# Patient Record
Sex: FEMALE | Race: WHITE | NOT HISPANIC OR LATINO | URBAN - METROPOLITAN AREA
[De-identification: names, ages, dates, MRNs, and addresses within clinical notes are randomized per-mention and may not be internally consistent; named-entity substitution may affect disease eponyms.]

---

## 2024-01-18 ENCOUNTER — TELEPHONE (OUTPATIENT)
Dept: GASTROENTEROLOGY | Facility: CLINIC | Age: 49
End: 2024-01-18
Payer: COMMERCIAL

## 2024-01-18 DIAGNOSIS — K21.9 GERD (GASTROESOPHAGEAL REFLUX DISEASE): Primary | ICD-10-CM

## 2024-01-19 ENCOUNTER — TELEPHONE (OUTPATIENT)
Dept: GASTROENTEROLOGY | Facility: CLINIC | Age: 49
End: 2024-01-19
Payer: COMMERCIAL

## 2024-01-26 RX ORDER — METFORMIN HYDROCHLORIDE 500 MG/1
500 TABLET ORAL
COMMUNITY
Start: 2023-09-08 | End: 2024-01-29

## 2024-01-26 RX ORDER — LEVOTHYROXINE, LIOTHYRONINE 38; 9 UG/1; UG/1
60 TABLET ORAL
COMMUNITY
Start: 2023-12-19

## 2024-01-26 RX ORDER — SPIRONOLACTONE 100 MG/1
100 TABLET, FILM COATED ORAL DAILY
COMMUNITY
Start: 2023-12-14

## 2024-01-29 ENCOUNTER — OFFICE VISIT (OUTPATIENT)
Dept: GASTROENTEROLOGY | Facility: CLINIC | Age: 49
End: 2024-01-29
Payer: COMMERCIAL

## 2024-01-29 VITALS
HEART RATE: 78 BPM | OXYGEN SATURATION: 98 % | WEIGHT: 226 LBS | BODY MASS INDEX: 35.47 KG/M2 | SYSTOLIC BLOOD PRESSURE: 126 MMHG | DIASTOLIC BLOOD PRESSURE: 77 MMHG | HEIGHT: 67 IN

## 2024-01-29 DIAGNOSIS — R79.89 ELEVATED LFTS: ICD-10-CM

## 2024-01-29 DIAGNOSIS — R14.0 BLOATING: ICD-10-CM

## 2024-01-29 DIAGNOSIS — K21.9 GASTROESOPHAGEAL REFLUX DISEASE WITHOUT ESOPHAGITIS: Primary | ICD-10-CM

## 2024-01-29 DIAGNOSIS — Z12.11 SCREENING FOR COLON CANCER: ICD-10-CM

## 2024-01-29 PROCEDURE — 1159F MED LIST DOCD IN RCRD: CPT | Mod: CPTII,S$GLB,,

## 2024-01-29 PROCEDURE — 99205 OFFICE O/P NEW HI 60 MIN: CPT | Mod: S$GLB,,,

## 2024-01-29 PROCEDURE — 1160F RVW MEDS BY RX/DR IN RCRD: CPT | Mod: CPTII,S$GLB,,

## 2024-01-29 PROCEDURE — 3074F SYST BP LT 130 MM HG: CPT | Mod: CPTII,S$GLB,,

## 2024-01-29 PROCEDURE — 3078F DIAST BP <80 MM HG: CPT | Mod: CPTII,S$GLB,,

## 2024-01-29 PROCEDURE — 3008F BODY MASS INDEX DOCD: CPT | Mod: CPTII,S$GLB,,

## 2024-01-29 RX ORDER — CYANOCOBALAMIN 1000 UG/ML
2000 INJECTION, SOLUTION INTRAMUSCULAR; SUBCUTANEOUS
COMMUNITY

## 2024-01-29 RX ORDER — OMEPRAZOLE 20 MG/1
20 CAPSULE, DELAYED RELEASE ORAL DAILY
COMMUNITY
End: 2024-01-29

## 2024-01-29 RX ORDER — SOD SULF/POT CHLORIDE/MAG SULF 1.479 G
TABLET ORAL
Qty: 24 TABLET | Refills: 0 | Status: SHIPPED | OUTPATIENT
Start: 2024-01-29

## 2024-01-29 RX ORDER — PROGESTERONE 100 MG/1
100 CAPSULE ORAL DAILY
COMMUNITY

## 2024-01-29 RX ORDER — FAMOTIDINE 20 MG/1
20 TABLET, FILM COATED ORAL DAILY
COMMUNITY
End: 2024-01-29

## 2024-01-29 RX ORDER — PANTOPRAZOLE SODIUM 40 MG/1
40 TABLET, DELAYED RELEASE ORAL DAILY
Qty: 90 TABLET | Refills: 1 | Status: SHIPPED | OUTPATIENT
Start: 2024-01-29

## 2024-01-29 NOTE — PROGRESS NOTES
Clinic Note    Reason for visit:  The primary encounter diagnosis was Gastroesophageal reflux disease without esophagitis. Diagnoses of Bloating, Elevated LFTs, and Screening for colon cancer were also pertinent to this visit.    PCP: Lashonda Bearden   No address on file Ashley Holm NP at [facility] in [place], [place].     HPI:  This is a 48 y.o. female who is here to establish care. She has been working with  for about a year now to compete. She went keto diet for 5 years in the past. Got off birth control and was put on HTN (test/progesterone). She gained 20 pounds in past year. She started getting heartburn daily a year ago as well. She started taking famotidine 20 mg daily and this helped somewhat. She also reports having bloating/distention after eating. She went low carb/calorie again in 12/2023 and has lost 20 pounds and noticed less bloating. She usually fasts until lunch, may have protein shake or meat/vegetable. She started taking omeprazole 20 mg daily around 12/2023 and has had more improvement with reflux so she stopped famotidine.  No bloating if she doesn't eat. Denies dysphagia. No nausea or blood in stool. No alcohol use. May eat 1,000 calories per day. She has BM daily or QOD. Takes magnesium. No diarrhea. No prior EGD/Colonoscopy. No FH of colon cancer, IBD, or Celiac. Father had stomach cancer at 46. Had TMJ surgery in high school.     She has O positive blood and gives blood every 4 months.     She has high stress job.  at [employer].    12/2023: HCVAb/HBVsAg/HBVcAb neg, HAVAb pos, Ferr/Fe/%sat//Trnsfrr/TIBC/Lipid wnl  11/2023: AST 41H, ALT 54H, Cr 0.9, CMP onl, CBC wnl    12/2023 ABD US: Fatty liver, no GB.     Review of Systems   Constitutional:  Negative for fatigue, fever and unexpected weight change.   HENT:  Negative for mouth sores, postnasal drip, sore throat and trouble swallowing.    Eyes:  Negative for pain, discharge and eye dryness.    Respiratory:  Positive for cough. Negative for apnea, choking, chest tightness, shortness of breath and wheezing.    Cardiovascular:  Negative for chest pain, palpitations and leg swelling.   Gastrointestinal:  Positive for abdominal distention and abdominal pain. Negative for anal bleeding, blood in stool, change in bowel habit, constipation, diarrhea, nausea, rectal pain, vomiting, reflux and fecal incontinence.   Genitourinary:  Negative for bladder incontinence, dysuria and hematuria.   Musculoskeletal:  Positive for back pain. Negative for arthralgias and joint swelling.   Integumentary:  Negative for color change and rash.   Allergic/Immunologic: Negative for environmental allergies and food allergies.   Neurological:  Negative for seizures and headaches.   Hematological:  Negative for adenopathy. Does not bruise/bleed easily.        Past Medical History:   Diagnosis Date    Disorder of thyroid, unspecified 2023    Hormone replacement therapy     Obesity, Class II, BMI 35-39.9, isolated (see actual BMI)      Past Surgical History:   Procedure Laterality Date    ABDOMINOPLASTY  2021    MANDIBLE FRACTURE SURGERY  1990    REDUCTION OF BOTH BREASTS  1998    TONSILLECTOMY  1979     Family History   Problem Relation Age of Onset    Thyroid disease Mother     Stomach cancer Father     Diabetes Paternal Grandmother     Colon cancer Neg Hx     Crohn's disease Neg Hx     Esophageal cancer Neg Hx     Irritable bowel syndrome Neg Hx     Liver cancer Neg Hx     Liver disease Neg Hx     Rectal cancer Neg Hx      Social History     Tobacco Use    Smoking status: Never    Smokeless tobacco: Never   Substance Use Topics    Alcohol use: Not Currently    Drug use: Never     Review of patient's allergies indicates:  No Known Allergies   Medication List with Changes/Refills   New Medications    PANTOPRAZOLE (PROTONIX) 40 MG TABLET    Take 1 tablet (40 mg total) by mouth once daily.    SOD SULF-POT CHLORIDE-MAG SULF (SUTAB)  "1.479-0.188- 0.225 GRAM TABLET    Take according to package instructions with indicated amount of water. No breakfast day before test. May substitute with Suprep, Clenpiq, Plenvu, Moviprep or GoLytely based on Rx plan and patient preference.   Current Medications    CYANOCOBALAMIN 1,000 MCG/ML INJECTION    2,000 mcg every 7 days.    NP THYROID 60 MG TAB    Take 60 mg by mouth before breakfast.    PROGESTERONE (PROMETRIUM) 100 MG CAPSULE    Take 100 mg by mouth once daily.    SPIRONOLACTONE (ALDACTONE) 100 MG TABLET    Take 100 mg by mouth once daily.   Discontinued Medications    FAMOTIDINE (PEPCID) 20 MG TABLET    Take 20 mg by mouth once daily.    METFORMIN (GLUCOPHAGE) 500 MG TABLET    Take 500 mg by mouth daily with breakfast. For 30 days    OMEPRAZOLE (PRILOSEC) 20 MG CAPSULE    Take 20 mg by mouth once daily.         Vital Signs:  /77 (BP Location: Left arm, Patient Position: Sitting)   Pulse 78   Ht 5' 7" (1.702 m)   Wt 102.5 kg (226 lb)   SpO2 98%   BMI 35.40 kg/m²        Physical Exam  Vitals reviewed.   Constitutional:       General: She is awake. She is not in acute distress.     Appearance: Normal appearance. She is well-developed. She is not ill-appearing, toxic-appearing or diaphoretic.   HENT:      Head: Normocephalic and atraumatic.      Nose: Nose normal.      Mouth/Throat:      Mouth: Mucous membranes are moist.      Pharynx: Oropharynx is clear. No oropharyngeal exudate or posterior oropharyngeal erythema.   Eyes:      General: Lids are normal. Gaze aligned appropriately. No scleral icterus.        Right eye: No discharge.         Left eye: No discharge.      Conjunctiva/sclera: Conjunctivae normal.   Neck:      Trachea: Trachea normal.   Cardiovascular:      Rate and Rhythm: Normal rate and regular rhythm.      Pulses:           Radial pulses are 2+ on the right side and 2+ on the left side.   Pulmonary:      Effort: Pulmonary effort is normal. No respiratory distress.      Breath " sounds: No stridor. No wheezing.   Chest:      Chest wall: No tenderness.   Abdominal:      General: Bowel sounds are normal. There is no distension.      Palpations: Abdomen is soft. There is no fluid wave, hepatomegaly or mass.      Tenderness: There is no abdominal tenderness. There is no guarding or rebound.   Musculoskeletal:         General: No tenderness or deformity.      Cervical back: Full passive range of motion without pain and neck supple. No tenderness.      Right lower leg: No edema.      Left lower leg: No edema.   Lymphadenopathy:      Cervical: No cervical adenopathy.   Skin:     General: Skin is warm and dry.      Capillary Refill: Capillary refill takes less than 2 seconds.      Coloration: Skin is not cyanotic, jaundiced or pale.   Neurological:      General: No focal deficit present.      Mental Status: She is alert and oriented to person, place, and time.      Motor: No tremor.   Psychiatric:         Attention and Perception: Attention normal.         Mood and Affect: Mood and affect normal.         Speech: Speech normal.         Behavior: Behavior normal. Behavior is cooperative.            All of the data above and below has been reviewed by myself and any further interpretations will be reflected in the assessment and plan.   The data includes review of external notes, and independent interpretation of lab results, procedures, x-rays, and imaging reports.      Assessment:  Gastroesophageal reflux disease without esophagitis  -     Ambulatory referral/consult to Gastroenterology  -     pantoprazole (PROTONIX) 40 MG tablet; Take 1 tablet (40 mg total) by mouth once daily.  Dispense: 90 tablet; Refill: 1  -     Ambulatory Referral to External Surgery    Bloating  -     Ambulatory Referral to External Surgery    Elevated LFTs    Screening for colon cancer  -     Ambulatory Referral to External Surgery  -     sod sulf-pot chloride-mag sulf (SUTAB) 1.479-0.188- 0.225 gram tablet; Take according  to package instructions with indicated amount of water. No breakfast day before test. May substitute with Suprep, Clenpiq, Plenvu, Moviprep or GoLytely based on Rx plan and patient preference.  Dispense: 24 tablet; Refill: 0    Bloating likely related to food intolerance. Improved since being mostly gluten free. Plan for GBx/DBx.   Fatty liver on US and mildly elevated LFTs. Would expect to improve with continued weight loss.  Omeprazole 20 mg better than famotidine 20mg. Will try panto 40 daily for reflux.  Due for screening colonoscopy.   Discussed elimination diet etc. Consider low FODMAP diet.      Recommendations:  Schedule upper and lower endoscopies with Dr. Feliz.   Stop omeprazole and begin taking pantoprazole 40 mg daily.   Continue current diet. Try elimination diet.    Risks, benefits, and alternatives of medical management, any associated procedures, and/or treatment discussed with the patient. Patient given opportunity to ask questions and voices understanding. Patient has elected to proceed with the recommended care modalities as discussed.    Follow up visit with Dr. Feliz.     Order summary:  Orders Placed This Encounter   Procedures    Ambulatory Referral to External Surgery        Instructed patient to notify my office if they have not been contacted within two weeks after any procedures, submitting any samples (biopsies, blood, stool, urine, etc.) or after any imaging (X-ray, CT, MRI, etc.).      Ashley Mcintosh NP    This document may have been created using a voice recognition transcribing system. Incorrect words or phrases may have been missed during proofreading. Please interpret accordingly or contact me for clarification.

## 2024-01-29 NOTE — LETTER
January 29, 2024        Lashonda Bearden, JENNIFER  [address]  [address]  [facility] [address]             [facility]  [address] [address]  Phone: [phone]  Fax: [phone]   Patient: Paula South   MR Number: [id number]   YOB: 1975   Date of Visit: 1/29/2024       Dear Dr. Bearden:    Thank you for referring Paula South to me for evaluation. Attached you will find relevant portions of my assessment and plan of care.    If you have questions, please do not hesitate to call me. I look forward to following Paula South along with you.    Sincerely,      Ashley Mcintosh, NP            CC  No Recipients    Enclosure

## 2024-01-29 NOTE — PATIENT INSTRUCTIONS
Schedule upper and lower endoscopies with Dr. Feliz.   Stop omeprazole and begin taking pantoprazole 40 mg daily.   Continue current diet. Try elimination diet.    Please notify my office if you have not been contacted within two weeks after any procedures, submitting any samples (biopsies, blood, stool, urine, etc.) or after any imaging (X-ray, CT, MRI, etc.).

## 2024-02-08 ENCOUNTER — TELEPHONE (OUTPATIENT)
Dept: GASTROENTEROLOGY | Facility: CLINIC | Age: 49
End: 2024-02-08
Payer: COMMERCIAL

## 2024-02-08 NOTE — TELEPHONE ENCOUNTER
Returned pt call and explained that two prescriptions were called on during her OV on 1/29/24 Sutab for her EGD/COLON and pantoprazole 40 mg for the acid reflux. I asked if she had her coupon from her OV and she stated no. I told her to go to Sutab website to get a coupon since she did not want to come back to the office. I told her to make sure she get the coupon before she picks up the prep laxative. Pt keep saying one of the Rx were $70 and the other Rx was not ready. Pt acknowledge she understood and would all back if she had any other questions. aram

## 2024-02-08 NOTE — TELEPHONE ENCOUNTER
----- Message from Tori Campbell sent at 2/8/2024  3:01 PM CST -----  Contact: Patient  Type:  Patient Returning Call    Who Called:Paula South   Who Left Message for Patient:n/a  Does the patient know what this is regarding?:Medication  Would the patient rather a call back or a response via [facility]? Call back  Best Call Back Number:[phone]   Additional Information: Patient was disconnected from provider while discussing medication.

## 2024-03-12 ENCOUNTER — TELEPHONE (OUTPATIENT)
Dept: GASTROENTEROLOGY | Facility: CLINIC | Age: 49
End: 2024-03-12
Payer: COMMERCIAL

## 2024-03-12 NOTE — TELEPHONE ENCOUNTER
----- Message from Tori Campbell sent at 3/12/2024  4:39 PM CDT -----  Contact: Patient  Patient is calling in regard her to inquiry. Patient is now noting after speaking with [facility], they have allowed her to have her information from patient chart with Dr. Feliz,including blood work, chart notes, etc., sent to their practice in order to make a final decision on seeing her as a patient. Please give a call back to [phone] for confirmation that information can be faxed to Pat [facility].

## 2024-03-12 NOTE — TELEPHONE ENCOUNTER
Message  Received: Today   Pt Advice  Tori Campbell Staff  Caller: Patient (Today,  4:09 PM)  Type:  Patient Requesting Referral    Who Called:Paula South  Does the patient already have the specialty appointment scheduled?:no  If yes, what is the date of that appointment?:n/a  Referral to What Specialty:[facility]  Reason for Referral:Upper stomach hardening  Does the patient want the referral with a specific physician?:no  Is the specialist an [facility] or [facility]?:[facility]  Patient Requesting a Response?:yes  Would the patient rather a call back or a response via [facility]? Call back  Best Call Back Number:[phone]  Additional Information:   Fax: [phone]  Phone:[phone]  Patient notes Specialty would like patient chart.

## 2024-03-12 NOTE — TELEPHONE ENCOUNTER
Message  Received: Today   Pt Advice  Aliyah Merino Staff  Caller: Ashley Holm's office (Today,  1:28 PM)  Type: Staff Message    Who called: Stephen Holm's office  Call back number: [phone]    Reason for the call: Would like office notes faxed ASAP  Additional information: Fax:[phone]

## 2024-03-12 NOTE — TELEPHONE ENCOUNTER
Pt requested her records be sent to PCP - Ashley Holm, JUAN CARLOS - [phone] (fax) -- she is trying to get a sooner appt in [place] for her EGD/Colon. -kg

## 2024-03-12 NOTE — TELEPHONE ENCOUNTER
Lvm letting pt know she is going to have to sign an VICKI for us to release her records to another office. -KG

## 2024-03-12 NOTE — TELEPHONE ENCOUNTER
----- Message from Nenita Tejeda sent at 3/12/2024 10:44 AM CDT -----  Contact: Paitent  Patient called to consult with nurse or staff regarding her upcoming procedure. She states she's needing another referral so she can be seen sooner and wanted to speak with clinic regarding this. She would like a call back and can be reached at [phone]. Thanks/MR

## 2024-03-12 NOTE — TELEPHONE ENCOUNTER
Returned patient's call. Lvm telling her that it would be her PCP that would refer her to [facility] and not [facility]. - dmp

## 2024-03-13 NOTE — TELEPHONE ENCOUNTER
Mrs. South contacted me through call center. I was able to obtain signed VICKI from patient for us to send her records to Lynne VEGAS. I faxed records to SHASTA Batista and also let patient know. BF

## 2024-03-26 ENCOUNTER — TELEPHONE (OUTPATIENT)
Dept: GASTROENTEROLOGY | Facility: CLINIC | Age: 49
End: 2024-03-26
Payer: COMMERCIAL

## 2024-03-26 NOTE — TELEPHONE ENCOUNTER
Returned pt call and she told me her story.  After seen [facility] on 1/29/24, pt had an Ov with her hormone doctor. The provider felt on her stomach and it was rock hard. Pt mention that she had not eat yet because she had lost 25 lb and wanted it to show on the scale, the appointment was at 3:30.    Pt mentioned that she typically fasts. She then proceed to tell me she was fired by her PCP because of something that was written in her OV notes from [facility], and pt does know what it stated and her PCP would not tell her.  Pt state she did like [facility] and would like to come back and see her. Pt also mentioned that we sent a ref [facility] and after the provider reviewed the referral, they would not take her as a patient either.       Pt feels something is wrong and wants the EGD/Colon moved up. I told her I can send a message to [facility], but currently she does not have anything sooner. Pt stated she is wanting her medical records to go see a GI provider in [place] on Thurs 3/28/24, because this provider can get her in sooner for a procedure. I provided pt with medical records contact number. Pt was not happy about having to reach out to another person. Please advice. aram

## 2024-03-26 NOTE — TELEPHONE ENCOUNTER
----- Message from Evin Zamora MA sent at 3/26/2024  8:52 AM CDT -----  Regarding: FW: Return Call  Contact: patient    ----- Message -----  From: Jonna Fitch  Sent: 3/25/2024   4:38 PM CDT  To: Tray DILLARD Staff  Subject: Return Call                                      Per phone call with patient, she stated that she would need to speak with the physician regarding her appointment for an upper and lower GI.  The caller would like to know why she can't be seen sooner than June or will you help her to get a referral to see another doctor.  Please return call at [phone] (home).    Thanks,  SJ

## 2024-03-27 NOTE — TELEPHONE ENCOUNTER
I do not have any sooner availabilities.  If she wishes to transition her GI care to a provider who can complete her procedures sooner, than that is her prerogative. If she wishes to keep her GI care with me, then I rec she keep the 6/17/2024 procedures as is.  JENNI

## 2024-04-01 NOTE — TELEPHONE ENCOUNTER
Called and left v/m that I was following up from our last conversation to see if she was going to have her colonoscopy sooner than 6/17/24 with [facility], so I would know if I need to remove her from our schedule. aram

## 2024-06-10 ENCOUNTER — TELEPHONE (OUTPATIENT)
Dept: GASTROENTEROLOGY | Facility: CLINIC | Age: 49
End: 2024-06-10
Payer: COMMERCIAL

## 2024-06-10 VITALS — HEIGHT: 67 IN | BODY MASS INDEX: 35.47 KG/M2 | WEIGHT: 226 LBS

## 2024-06-10 DIAGNOSIS — Z12.11 SCREENING FOR COLON CANCER: ICD-10-CM

## 2024-06-10 DIAGNOSIS — K21.9 GASTROESOPHAGEAL REFLUX DISEASE WITHOUT ESOPHAGITIS: Primary | ICD-10-CM

## 2024-06-10 DIAGNOSIS — R14.0 BLOATING: ICD-10-CM

## 2024-06-10 NOTE — TELEPHONE ENCOUNTER
"[facility]  [address] [address]  Phone: [phone]  Fax: [phone]    History & Physical         Provider: Dr. April Feliz    Patient Name: Paula RANDHAWA (age):1975  49 y.o.           Gender: female   Phone: [phone]     Referring Physician: Lashonda Bearden     Vital Signs:   Height - 5' 7"  Weight - 226 lb  BMI -  35.40    Plan: EGD w/Gbx and Dbx/Colonoscopy @ [facility]    Encounter Diagnoses   Name Primary?    Gastroesophageal reflux disease without esophagitis Yes    Screening for colon cancer     Bloating            History:      Past Medical History:   Diagnosis Date    Disorder of thyroid, unspecified     Hormone replacement therapy     Obesity, Class II, BMI 35-39.9, isolated (see actual BMI)       Past Surgical History:   Procedure Laterality Date    ABDOMINOPLASTY      MANDIBLE FRACTURE SURGERY      REDUCTION OF BOTH BREASTS      TONSILLECTOMY        Medication List with Changes/Refills   Current Medications    CYANOCOBALAMIN 1,000 MCG/ML INJECTION    2,000 mcg every 7 days.    NP THYROID 60 MG TAB    Take 60 mg by mouth before breakfast.    PANTOPRAZOLE (PROTONIX) 40 MG TABLET    Take 1 tablet (40 mg total) by mouth once daily.    PROGESTERONE (PROMETRIUM) 100 MG CAPSULE    Take 100 mg by mouth once daily.    SOD SULF-POT CHLORIDE-MAG SULF (SUTAB) 1.479-0.188- 0.225 GRAM TABLET    Take according to package instructions with indicated amount of water. No breakfast day before test. May substitute with Suprep, Clenpiq, Plenvu, Moviprep or GoLytely based on Rx plan and patient preference.    SPIRONOLACTONE (ALDACTONE) 100 MG TABLET    Take 100 mg by mouth once daily.      Review of patient's allergies indicates:  No Known Allergies   Family History   Problem Relation Name Age of Onset    Thyroid disease Mother      Stomach cancer Father      " Diabetes Paternal Grandmother      Colon cancer Neg Hx      Crohn's disease Neg Hx      Esophageal cancer Neg Hx      Irritable bowel syndrome Neg Hx      Liver cancer Neg Hx      Liver disease Neg Hx      Rectal cancer Neg Hx        Social History     Tobacco Use    Smoking status: Never    Smokeless tobacco: Never   Substance Use Topics    Alcohol use: Not Currently    Drug use: Never        Physical Examination:     General Appearance:___________________________  HEENT: _____________________________________  Abdomen:____________________________________  Heart:________________________________________  Lungs:_______________________________________  Extremities:___________________________________  Skin:_________________________________________  Endocrine:____________________________________  Genitourinary:_________________________________  Neurological:__________________________________      Patient has been evaluated immediately prior to sedation and is medically cleared for endoscopy with IVCS as an ASA class: ______      Physician Signature: _________________________       Date: ________  Time: ________

## 2024-06-10 NOTE — TELEPHONE ENCOUNTER
Called and left detailed message that I was calling as a courtesy regarding up coming EGD/Colon with [facility] on 6/17/24, Monday and wanted to verify that she has her paper prep instructions and meds. I also mentioned that [facility] will call the day before (Fri) with the arrival time, GI Lab is located on the third floor, and to pre-register sometime this week. aram

## 2024-06-14 NOTE — TELEPHONE ENCOUNTER
Rec'd incoming call from Eileen with [facility] admitting that she s/w the pt and she cancelled her procedure. Pt stated she had her procedure done in [place]. aram